# Patient Record
Sex: FEMALE | Race: WHITE | NOT HISPANIC OR LATINO | Employment: FULL TIME | URBAN - METROPOLITAN AREA
[De-identification: names, ages, dates, MRNs, and addresses within clinical notes are randomized per-mention and may not be internally consistent; named-entity substitution may affect disease eponyms.]

---

## 2019-08-20 ENCOUNTER — TELEPHONE (OUTPATIENT)
Dept: FAMILY MEDICINE CLINIC | Facility: CLINIC | Age: 19
End: 2019-08-20

## 2019-08-20 ENCOUNTER — OFFICE VISIT (OUTPATIENT)
Dept: FAMILY MEDICINE CLINIC | Facility: CLINIC | Age: 19
End: 2019-08-20
Payer: COMMERCIAL

## 2019-08-20 VITALS
TEMPERATURE: 99.8 F | WEIGHT: 144.2 LBS | HEART RATE: 84 BPM | RESPIRATION RATE: 12 BRPM | OXYGEN SATURATION: 99 % | DIASTOLIC BLOOD PRESSURE: 72 MMHG | BODY MASS INDEX: 26.54 KG/M2 | SYSTOLIC BLOOD PRESSURE: 112 MMHG | HEIGHT: 62 IN

## 2019-08-20 DIAGNOSIS — Z23 NEED FOR VACCINATION: ICD-10-CM

## 2019-08-20 DIAGNOSIS — Z00.00 WELL ADULT EXAM: Primary | ICD-10-CM

## 2019-08-20 DIAGNOSIS — IMO0001 BIRTH CONTROL: ICD-10-CM

## 2019-08-20 PROCEDURE — 90472 IMMUNIZATION ADMIN EACH ADD: CPT

## 2019-08-20 PROCEDURE — 90471 IMMUNIZATION ADMIN: CPT

## 2019-08-20 PROCEDURE — 90621 MENB-FHBP VACC 2/3 DOSE IM: CPT

## 2019-08-20 PROCEDURE — 99395 PREV VISIT EST AGE 18-39: CPT | Performed by: FAMILY MEDICINE

## 2019-08-20 PROCEDURE — 90651 9VHPV VACCINE 2/3 DOSE IM: CPT

## 2019-08-20 RX ORDER — CETIRIZINE HYDROCHLORIDE 5 MG/1
10 TABLET, CHEWABLE ORAL DAILY
COMMUNITY
End: 2021-08-26

## 2019-08-20 RX ORDER — NORGESTIMATE AND ETHINYL ESTRADIOL 7DAYSX3 LO
1 KIT ORAL DAILY
Qty: 90 TABLET | Refills: 2 | Status: SHIPPED | OUTPATIENT
Start: 2019-08-20 | End: 2020-03-09 | Stop reason: SDUPTHER

## 2019-08-20 RX ORDER — NORGESTIMATE AND ETHINYL ESTRADIOL 7DAYSX3 LO
1 KIT ORAL DAILY
Qty: 90 TABLET | Refills: 2 | Status: SHIPPED | OUTPATIENT
Start: 2019-08-20 | End: 2019-08-20 | Stop reason: SDUPTHER

## 2019-08-20 NOTE — PROGRESS NOTES
Assessment/Plan:     Problem List Items Addressed This Visit     None      Visit Diagnoses     Well adult exam    -  Primary    Need for vaccination        Relevant Orders    MENINGOCOCCAL B OMV    HPV VACCINE 9 VALENT IM    Birth control     Reviewed other options  Start pap age 23  With headache during menses she wishes to try triphasic pill (her uncle is a pharmacist and recommended this)  Consider IUD instead if this is not helping  Relevant Medications    norgestimate-ethinyl estradiol (ORTHO TRI-CYCLEN LO) 0 18/0 215/0 25 MG-25 MCG per tablet        Diagnoses and all orders for this visit:    Well adult exam       Well adult exam  ·         Continue healthy diet   ·         Encourage exercise 4 times a week or more for minimum 30 minutes  ·         Continue to see dentist, wear seatbelt  ·         Health maintenance reviewed - Bexero #2 and HPV #3 given today  She will get flu shot in the fall at school  Reviewed age appropriate health maintenance screenings and immunizations that are due, risks and benefits of these  Health Maintenance   Topic Date Due    Depression Screening PHQ  1999    BMI: Followup Plan  01/01/2017    BMI: Adult  01/01/2017    INFLUENZA VACCINE  07/01/2019    DTaP,Tdap,and Td Vaccines (7 - Td) 08/11/2020    Pneumococcal Vaccine: 65+ Years (1 of 2 - PCV13) 01/01/2064    HEPATITIS B VACCINES  Completed    Pneumococcal Vaccine: Pediatrics (0 to 5 Years) and At-Risk Patients (6 to 59 Years)  Aged Out     No follow-ups on file  There are no Patient Instructions on file for this visit  BMI Counseling: Body mass index is 26 2 kg/m²  Discussed the patient's BMI with her  The BMI is above average  BMI counseling and education was provided to the patient  Exercise recommendations include exercising 3-5 times per week        Subjective:    ULISES     Carole Chau is a 20 y o  female who presents today for a physical      Chief Complaint   Patient presents with    Physical Exam ---Above per clinical staff & reviewed  ---  No My Sticky Note on file  PHQ-9 Depression Screening    PHQ-9:    Frequency of the following problems over the past two weeks:               Diet: healthy diet   Exercise:  Little, nothing formal  More in school  Dental visits:  Yes   Seatbelt: yes     Concerns today:  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   Happy there  regular - monthly and last 5- 6 days  Heavy mid way  sprintec now - headaches during placebo pill   Moods different also  The following portions of the patient's history were reviewed and updated as appropriate: allergies, current medications, past family history, past medical history, past social history, past surgical history and problem list      Current Outpatient Medications   Medication Sig Dispense Refill    cetirizine (ZyrTEC) 5 MG chewable tablet Chew 10 mg daily      norgestimate-ethinyl estradiol (ORTHO TRI-CYCLEN LO) 0 18/0 215/0 25 MG-25 MCG per tablet Take 1 tablet by mouth daily 90 tablet 2     No current facility-administered medications for this visit  Review of Systems  ROS:  all others negative - no chest pain, SOB, normal urine and bowels  no GERD  sleeping well  mood good  Objective:      /72   Pulse 84   Temp 99 8 °F (37 7 °C)   Resp 12   Ht 5' 2 21" (1 58 m)   Wt 65 4 kg (144 lb 3 2 oz)   SpO2 99%   BMI 26 20 kg/m²     BP Readings from Last 3 Encounters:   08/20/19 112/72   07/29/15 (!) 120/60 (85 %, Z = 1 06 /  30 %, Z = -0 54)*   08/06/14 110/70 (57 %, Z = 0 19 /  70 %, Z = 0 51)*     *BP percentiles are based on the August 2017 AAP Clinical Practice Guideline for girls     Wt Readings from Last 3 Encounters:   08/20/19 65 4 kg (144 lb 3 2 oz)   07/29/15 57 6 kg (127 lb) (62 %, Z= 0 31)*   08/06/14 56 kg (123 lb 8 oz) (61 %, Z= 0 29)*     * Growth percentiles are based on [facility] (Girls, 2-20 Years) data       Physical Exam   Constitutional: she is oriented to person, place, and time  she appears well-developed and well-nourished  HENT: Head: Normocephalic  Right Ear: External ear normal  Tympanic membrane normal    Left Ear: External ear normal  Tympanic membrane normal    Nose: Nose normal  No mucosal edema, No rhinorrhea  Right sinus exhibits no maxillary sinus tenderness  Left sinus exhibits no maxillary sinus tenderness  Mouth/Throat: Oropharynx is clear and moist    Eyes: Normal conjunctiva  No erythema  No discharge  Neck: No pain on exam  Neck supple  Cardiovascular: Normal rate, regular rhythm and normal heart sounds  Pulmonary/Chest: Effort normal and breath sounds normal  No wheezes  No rales  No rhonchi  Abdominal: Soft  Bowel sounds are normal  There is no tenderness  Musculoskeletal: she exhibits no edema  Lymphadenopathy: she has no cervical adenopathy  Neurological: she  is alert and oriented to person, place, and time  Skin: Skin is warm and dry  No rashes  Psychiatric: she  has a normal mood and affect  her behavior is normal  Thought content normal    Vitals reviewed

## 2019-08-20 NOTE — TELEPHONE ENCOUNTER
Placed call to pharmacy to cancel prescription as patient wanted printed script  Medication cancelled at pharmacy

## 2020-02-08 ENCOUNTER — PATIENT MESSAGE (OUTPATIENT)
Dept: FAMILY MEDICINE CLINIC | Facility: CLINIC | Age: 20
End: 2020-02-08

## 2020-02-10 NOTE — TELEPHONE ENCOUNTER
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 Are you able to complete the documents with a date more recent than August? I can fax the documents since I am currently at school in [facility]  Thank you!      [employer]

## 2020-08-17 DIAGNOSIS — Z30.9 ENCOUNTER FOR CONTRACEPTIVE MANAGEMENT, UNSPECIFIED TYPE: ICD-10-CM

## 2020-08-17 RX ORDER — NORGESTIMATE AND ETHINYL ESTRADIOL
KIT
Qty: 84 TABLET | OUTPATIENT
Start: 2020-08-17

## 2020-08-17 NOTE — TELEPHONE ENCOUNTER
Medication refill received from [facility]  Please let the patient know that we do not accept refills directly from the pharmacy

## 2020-08-17 NOTE — TELEPHONE ENCOUNTER
Medication refill request: [facility] [address] office visit: 8/20/19  Next office visit: 8/21/20  Last refilled: 3/9/2020 #90x1  Pharmacy:   [facility] [address] [place] [id number]  Phone: [phone] Fax: [phone]    Pended #90x1

## 2020-08-18 RX ORDER — NORGESTIMATE AND ETHINYL ESTRADIOL 7DAYSX3 LO
1 KIT ORAL DAILY
Qty: 90 TABLET | Refills: 2 | Status: SHIPPED | OUTPATIENT
Start: 2020-08-18 | End: 2020-08-21 | Stop reason: SDUPTHER

## 2020-08-19 LAB — EXTERNAL CHLAMYDIA RESULT: DETECTED

## 2020-08-21 ENCOUNTER — TELEPHONE (OUTPATIENT)
Dept: ADMINISTRATIVE | Facility: OTHER | Age: 20
End: 2020-08-21

## 2020-08-21 ENCOUNTER — OFFICE VISIT (OUTPATIENT)
Dept: FAMILY MEDICINE CLINIC | Facility: CLINIC | Age: 20
End: 2020-08-21
Payer: COMMERCIAL

## 2020-08-21 VITALS
SYSTOLIC BLOOD PRESSURE: 90 MMHG | RESPIRATION RATE: 12 BRPM | OXYGEN SATURATION: 98 % | HEART RATE: 88 BPM | TEMPERATURE: 99 F | WEIGHT: 141 LBS | DIASTOLIC BLOOD PRESSURE: 60 MMHG | HEIGHT: 61 IN | BODY MASS INDEX: 26.62 KG/M2

## 2020-08-21 DIAGNOSIS — Z11.59 NEED FOR HEPATITIS C SCREENING TEST: ICD-10-CM

## 2020-08-21 DIAGNOSIS — Z30.9 ENCOUNTER FOR CONTRACEPTIVE MANAGEMENT, UNSPECIFIED TYPE: ICD-10-CM

## 2020-08-21 DIAGNOSIS — Z11.4 SCREENING FOR HIV (HUMAN IMMUNODEFICIENCY VIRUS): ICD-10-CM

## 2020-08-21 DIAGNOSIS — Z00.00 WELL ADULT EXAM: Primary | ICD-10-CM

## 2020-08-21 DIAGNOSIS — Z13.1 SCREENING FOR DIABETES MELLITUS: ICD-10-CM

## 2020-08-21 DIAGNOSIS — Z13.220 LIPID SCREENING: ICD-10-CM

## 2020-08-21 DIAGNOSIS — Z23 NEED FOR VACCINATION: ICD-10-CM

## 2020-08-21 PROCEDURE — 3008F BODY MASS INDEX DOCD: CPT | Performed by: FAMILY MEDICINE

## 2020-08-21 PROCEDURE — 90471 IMMUNIZATION ADMIN: CPT | Performed by: FAMILY MEDICINE

## 2020-08-21 PROCEDURE — 90715 TDAP VACCINE 7 YRS/> IM: CPT | Performed by: FAMILY MEDICINE

## 2020-08-21 PROCEDURE — 1036F TOBACCO NON-USER: CPT | Performed by: FAMILY MEDICINE

## 2020-08-21 PROCEDURE — 90621 MENB-FHBP VACC 2/3 DOSE IM: CPT | Performed by: FAMILY MEDICINE

## 2020-08-21 PROCEDURE — 99395 PREV VISIT EST AGE 18-39: CPT | Performed by: FAMILY MEDICINE

## 2020-08-21 PROCEDURE — 90472 IMMUNIZATION ADMIN EACH ADD: CPT | Performed by: FAMILY MEDICINE

## 2020-08-21 PROCEDURE — 3725F SCREEN DEPRESSION PERFORMED: CPT | Performed by: FAMILY MEDICINE

## 2020-08-21 RX ORDER — NORGESTIMATE AND ETHINYL ESTRADIOL 7DAYSX3 LO
1 KIT ORAL DAILY
Qty: 90 TABLET | Refills: 2 | Status: SHIPPED | OUTPATIENT
Start: 2020-08-21 | End: 2021-04-20 | Stop reason: SDUPTHER

## 2020-08-21 NOTE — TELEPHONE ENCOUNTER
Upon review of your request/inquiry, we have found/obtained the documentation  The status of this report is in progress/pending/awaiting final  Due to protocols, we are unable to hold requests for resulting/linking of a pending/ in progress/awaiting final items and are unable to proceed  Any additional questions or concerns should be emailed to the Practice Liaisons via [email or web address] email, please do not reply via In Basket       Thank you  Naomi Medley MA

## 2020-08-21 NOTE — PROGRESS NOTES
Assessment/Plan:     Problem List Items Addressed This Visit     None      Visit Diagnoses     Well adult exam    -  Primary    Need for vaccination        Relevant Orders    TDAP VACCINE GREATER THAN OR EQUAL TO 8YO IM    MENINGOCOCCAL B RECOMBINANT    Screening for HIV (human immunodeficiency virus)        Relevant Orders    HIV 1/2 Antigen/Antibody (4th Generation) w Reflex [facility]    Need for hepatitis C screening test        Relevant Orders    Hepatitis C antibody    Encounter for contraceptive management, unspecified type        Relevant Medications    norgestimate-ethinyl estradiol (Ortho Tri-Cyclen Lo) 0 18/0 215/0 25 MG-25 MCG per tablet    Lipid screening        Relevant Orders    Lipid panel    Screening for diabetes mellitus        Relevant Orders    Comprehensive metabolic panel          Well adult exam  ·         Continue healthy diet   ·         Encourage exercise 4 times a week or more for minimum 30 minutes  ·         Continue to see dentist, wear seatbelt  ·         Health maintenance reviewed and up-to-date  Reviewed age appropriate health maintenance screenings and immunizations that are due, risks and benefits of these     [place] #2 given today   Update fasting blood work   Pap & Chlamydia this with Dr Gregory Terrell this week- await results   Health Maintenance   Topic Date Due    HIV Screening  01/01/2014    Chlamydia Screening  01/01/2015    Cervical Cancer Screening  01/01/2020    Influenza Vaccine  07/01/2020    DTaP,Tdap,and Td Vaccines (7 - Td) 08/11/2020    BMI: Followup Plan  08/20/2020    Annual Physical  08/20/2020    Depression Screening PHQ  08/21/2021    BMI: Adult  08/21/2021    HIB Vaccine  Completed    Hepatitis B Vaccine  Completed    IPV Vaccine  Completed    Hepatitis A Vaccine  Completed    Meningococcal ACWY Vaccine  Completed    HPV Vaccine  Completed    Pneumococcal Vaccine: Pediatrics (0 to 5 Years) and At-Risk Patients (6 to 59 Years)  Aged Out     No follow-ups on file  Subjective:    HPI     Merlin Andrea is a 23 y o  female who presents today for a physical      Chief Complaint   Patient presents with    Annual Exam     no concerns  PHQ-9 Depression Screening    PHQ-9:    Frequency of the following problems over the past two weeks:       Little interest or pleasure in doing things:  0 - not at all  Feeling down, depressed, or hopeless:  0 - not at all  PHQ-2 Score:  0        ---Above per clinical staff & reviewed  ---  Patient here today for a physical:  No My Sticky Note on file  Diet: healthy   Exercise:  3 -4 times a week - treadmill, outside   Dental visits:  Yes, overdue due to COVID   Seatbelt: yes     Wed had first pap - not bad     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           The following portions of the patient's history were reviewed and updated as appropriate: allergies, current medications, past family history, past medical history, past social history, past surgical history and problem list      Current Outpatient Medications   Medication Sig Dispense Refill    cetirizine (ZyrTEC) 5 MG chewable tablet Chew 10 mg daily      norgestimate-ethinyl estradiol (Ortho Tri-Cyclen Lo) 0 18/0 215/0 25 MG-25 MCG per tablet Take 1 tablet by mouth daily 90 tablet 2     No current facility-administered medications for this visit  Objective:      BP 90/60   Pulse 88   Temp 99 °F (37 2 °C)   Resp 12   Ht 5' 1" (1 549 m)   Wt 64 kg (141 lb)   SpO2 98%   BMI 26 64 kg/m²   BP Readings from Last 3 Encounters:   08/21/20 90/60   08/20/19 112/72   07/29/15 (!) 120/60 (85 %, Z = 1 06 /  30 %, Z = -0 54)*     *BP percentiles are based on the 2017 AAP Clinical Practice Guideline for girls     Wt Readings from Last 3 Encounters:   08/21/20 64 kg (141 lb)   08/20/19 65 4 kg (144 lb 3 2 oz)   07/29/15 57 6 kg (127 lb) (62 %, Z= 0 31)*     * Growth percentiles are based on CDC (Girls, 2-20 Years) data         Review of Systems  ROS:  all others negative - no chest pain, SOB, normal urine and bowels  no GERD  sleeping well  mood good  Physical Exam   Constitutional: she appears well-developed and well-nourished  HENT: Head: Normocephalic  Right Ear: External ear normal  Tympanic membrane normal    Left Ear: External ear normal  Tympanic membrane normal    Nose: Nose normal  No mucosal edema, No rhinorrhea  Right sinus exhibits no maxillary sinus tenderness  Left sinus exhibits no maxillary sinus tenderness  Mouth/Throat: Oropharynx is clear and moist    Eyes: Normal conjunctiva  No erythema  No discharge  Neck: No pain on exam  Neck supple  Cardiovascular: Normal rate, regular rhythm and normal heart sounds  Pulmonary/Chest: Effort normal and breath sounds normal  No wheezes  No rales  No rhonchi  Abdominal: Soft  Bowel sounds are normal  There is no tenderness  Musculoskeletal: she exhibits no edema  Lymphadenopathy: she has no cervical adenopathy  Neurological: she  is alert and oriented to person, place, and time  Skin: Skin is warm and dry  No rashes  Psychiatric: she  has a normal mood and affect  her behavior is normal  Thought content normal    Vitals reviewed  BMI Counseling: Body mass index is 26 64 kg/m²  The BMI is above normal  Nutrition recommendations include decreasing portion sizes  Exercise recommendations include exercising 3-5 times per week

## 2020-08-21 NOTE — TELEPHONE ENCOUNTER
----- Message from Li Sanz sent at 2020  4:44 PM EDT -----  RegardinBernie Donahue  20 4:44 PM    Hello, our patient Lawernce Mess has had Chlamydia and Pap Smear (HPV) aka Cervical Cancer Screening completed/performed  Please assist in updating the patient chart by pulling the document from Encounter Tab within Chart Review   The date of service is 20    Thank you,  Malena Meyers MA  [facility]

## 2021-01-27 ENCOUNTER — PATIENT MESSAGE (OUTPATIENT)
Dept: FAMILY MEDICINE CLINIC | Facility: CLINIC | Age: 21
End: 2021-01-27

## 2021-01-28 NOTE — TELEPHONE ENCOUNTER
From: Joselito Valenzuela LPN  To: Kerry Bowman  Sent: 1/27/2021 11:56 AM EST  Subject: Influenza Vaccine     Hello,     We hope you are doing well  While it is important to receive your flu vaccine yearly, our records show that you have not yet received yours for this year  Please call the office at [phone] if you are interested in scheduling a visit to receive the vaccine  If you have had your flu vaccine outside of our office, please reply to this message with the date and location so we can update your medical record  If you have already received your flu vaccine in our office, please disregard this message            We look forward to hearing from you,    Your Medical Team at [address]

## 2021-04-20 DIAGNOSIS — Z30.9 ENCOUNTER FOR CONTRACEPTIVE MANAGEMENT, UNSPECIFIED TYPE: ICD-10-CM

## 2021-04-21 RX ORDER — NORGESTIMATE AND ETHINYL ESTRADIOL 7DAYSX3 LO
1 KIT ORAL DAILY
Qty: 90 TABLET | Refills: 2 | Status: SHIPPED | OUTPATIENT
Start: 2021-04-21 | End: 2022-01-12 | Stop reason: SDUPTHER

## 2021-04-21 NOTE — TELEPHONE ENCOUNTER
Medication refill requested: Parker Amezcua-Cyclekiko Somers  Last office visit: 08/21/20  Next office visit: None  Last refilled: 08/21/20, 90 x 2  Pharmacy :   [facility] [address] [place] [id number]  Phone: [phone] Fax: [phone]       Pended: 90 x 2

## 2021-04-26 DIAGNOSIS — Z30.9 ENCOUNTER FOR CONTRACEPTIVE MANAGEMENT, UNSPECIFIED TYPE: ICD-10-CM

## 2021-04-26 RX ORDER — NORGESTIMATE AND ETHINYL ESTRADIOL
KIT
Qty: 84 TABLET | OUTPATIENT
Start: 2021-04-26

## 2021-04-26 NOTE — TELEPHONE ENCOUNTER
Medication refill received from [facility]  Please let the patient know that we do not accept refills directly from the pharmacy

## 2021-08-23 ENCOUNTER — TELEPHONE (OUTPATIENT)
Dept: ADMINISTRATIVE | Facility: OTHER | Age: 21
End: 2021-08-23

## 2021-08-23 NOTE — TELEPHONE ENCOUNTER
----- Message from Li Buchanan sent at 8/20/2021  4:07 PM EDT -----  Regarding: FELTON BRODY  08/20/21 4:08 PM    Hello, our patient Kirill Newman has had Chlamydia completed/performed  Please assist in updating the patient chart by pulling a previous Electronic Medical Record (EMR) document  The previous EMR is CE   The date of service is 8/19/20    Thank you,  Manuelito Cintron, MA  PG  Ana M Hoskins

## 2021-08-23 NOTE — TELEPHONE ENCOUNTER
----- Message from Jared Albright, [address] [place] sent at 8/20/2021  4:06 PM EDT -----  Regarding: FELTON BRODY  08/20/21 4:07 PM    Hello, our patient Joyce Guerra has had Pap Smear (HPV) aka Cervical Cancer Screening completed/performed  Please assist in updating the patient chart by pulling a previous Electronic Medical Record (EMR) document  The previous EMR is CE   The date of service is 8/19/20    Thank you,  Jared Albright MA  PG  David Manual

## 2021-08-24 NOTE — TELEPHONE ENCOUNTER
Upon review of the In Basket request we were able to locate, review, and update the patient chart as requested for Chlamydia and Pap Smear (HPV) aka Cervical Cancer Screening  Any additional questions or concerns should be emailed to the Practice Liaisons via [email or web address] email, please do not reply via In Basket      Thank you  Dougie Civil

## 2021-08-26 ENCOUNTER — OFFICE VISIT (OUTPATIENT)
Dept: FAMILY MEDICINE CLINIC | Facility: CLINIC | Age: 21
End: 2021-08-26
Payer: COMMERCIAL

## 2021-08-26 VITALS
BODY MASS INDEX: 26.39 KG/M2 | RESPIRATION RATE: 18 BRPM | SYSTOLIC BLOOD PRESSURE: 118 MMHG | DIASTOLIC BLOOD PRESSURE: 72 MMHG | WEIGHT: 139.8 LBS | HEIGHT: 61 IN | OXYGEN SATURATION: 98 % | HEART RATE: 80 BPM | TEMPERATURE: 97.5 F

## 2021-08-26 DIAGNOSIS — Z11.8 SCREENING FOR CHLAMYDIAL DISEASE: ICD-10-CM

## 2021-08-26 DIAGNOSIS — Z00.00 WELL ADULT EXAM: Primary | ICD-10-CM

## 2021-08-26 DIAGNOSIS — J30.1 SEASONAL ALLERGIC RHINITIS DUE TO POLLEN: ICD-10-CM

## 2021-08-26 PROCEDURE — 3725F SCREEN DEPRESSION PERFORMED: CPT | Performed by: FAMILY MEDICINE

## 2021-08-26 PROCEDURE — 3008F BODY MASS INDEX DOCD: CPT | Performed by: FAMILY MEDICINE

## 2021-08-26 PROCEDURE — 1036F TOBACCO NON-USER: CPT | Performed by: FAMILY MEDICINE

## 2021-08-26 PROCEDURE — 99395 PREV VISIT EST AGE 18-39: CPT | Performed by: FAMILY MEDICINE

## 2021-08-26 RX ORDER — CETIRIZINE HYDROCHLORIDE 10 MG/1
10 TABLET ORAL DAILY
Start: 2021-08-26

## 2021-08-26 NOTE — PROGRESS NOTES
Assessment/Plan:     Problem List Items Addressed This Visit     None      Visit Diagnoses     Well adult exam    -  Primary    Screening for chlamydial disease        Relevant Orders    Chlamydia/GC amplified DNA by PCR    Seasonal allergic rhinitis due to pollen        Relevant Medications    cetirizine (ZyrTEC) 10 mg tablet      labs done 2 days ago  she will call us if we do not contact her with lab results by next week     Well adult exam  ·         Continue healthy diet   ·         Encourage exercise 4 times a week or more for minimum 30 minutes  ·         Continue to see dentist, wear seatbelt  ·         Health maintenance reviewed and up-to-date  Reviewed age appropriate health maintenance screenings and immunizations that are due, risks and benefits of these     Health Maintenance   Topic Date Due    Hepatitis C Screening  Never done    HIV Screening  Never done    Chlamydia Screening  08/19/2021    Influenza Vaccine (1) 09/01/2021    Depression Screening PHQ  08/26/2022    BMI: Followup Plan  08/26/2022    BMI: Adult  08/26/2022    Annual Physical  08/26/2022    Cervical Cancer Screening  08/19/2023    DTaP,Tdap,and Td Vaccines (8 - Td or Tdap) 08/21/2030    HIB Vaccine  Completed    Hepatitis B Vaccine  Completed    IPV Vaccine  Completed    Hepatitis A Vaccine  Completed    Meningococcal ACWY Vaccine  Completed    HPV Vaccine  Completed    COVID-19 Vaccine  Completed    Pneumococcal Vaccine: Pediatrics (0 to 5 Years) and At-Risk Patients (6 to 59 Years)  Aged Out     Return in about 1 year (around 8/26/2022) for Annual physical     Subjective:    ULISES Quinones is a 24 y o  female who presents today for a physical      Chief Complaint   Patient presents with    Physical Exam     PHQ-9 Depression Screening    PHQ-9:   Frequency of the following problems over the past two weeks:      Little interest or pleasure in doing things: 0 - not at all  Feeling down, depressed, or hopeless: 0 - not at all  PHQ-2 Score: 0        ---Above per clinical staff & reviewed  ---  Patient here today for a physical:    Diet: healthy diet   Exercise: At home, you tube cardio   Dental visits:  Yes   Seatbelt: yes     Concerns today:  No concerns  Had blood work tues at [address] well on OCP   Not currently in a relationship - has been using protection every time when she is   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  The following portions of the patient's history were reviewed and updated as appropriate: allergies, current medications, past family history, past medical history, past social history, past surgical history and problem list      Current Medications:  Current Outpatient Medications   Medication Sig Dispense Refill    norgestimate-ethinyl estradiol (Ortho Tri-Cyclen Lo) 0 18/0 215/0 25 MG-25 MCG per tablet Take 1 tablet by mouth daily 90 tablet 2    cetirizine (ZyrTEC) 10 mg tablet Take 1 tablet (10 mg total) by mouth daily       No current facility-administered medications for this visit  Objective:      /72   Pulse 80   Temp 97 5 °F (36 4 °C)   Resp 18   Ht 5' 0 55" (1 538 m)   Wt 63 4 kg (139 lb 12 8 oz)   SpO2 98%   BMI 26 81 kg/m²   BP Readings from Last 3 Encounters:   08/26/21 118/72   08/21/20 90/60   08/20/19 112/72     Wt Readings from Last 3 Encounters:   08/26/21 63 4 kg (139 lb 12 8 oz)   08/21/20 64 kg (141 lb)   08/20/19 65 4 kg (144 lb 3 2 oz)       Review of Systems  ROS:  all others negative - no chest pain, SOB, normal urine and bowels  no GERD  sleeping well  mood good  Physical Exam   Constitutional: she appears well-developed and well-nourished  HENT: Head: Normocephalic  Right Ear: External ear normal  Tympanic membrane normal    Left Ear: External ear normal  Tympanic membrane normal    Nose: Nose normal  No mucosal edema, No rhinorrhea  Right sinus exhibits no maxillary sinus tenderness     Left sinus exhibits no maxillary sinus tenderness  Mouth/Throat: Oropharynx is clear and moist    Eyes: Normal conjunctiva  No erythema  No discharge  Neck: No pain on exam  Neck supple  Cardiovascular: Normal rate, regular rhythm and normal heart sounds  Pulmonary/Chest: Effort normal and breath sounds normal  No wheezes  No rales  No rhonchi  Abdominal: Soft  Bowel sounds are normal  There is no tenderness  Musculoskeletal: she exhibits no edema  Lymphadenopathy: she has no cervical adenopathy  Neurological: she  is alert and oriented to person, place, and time  Skin: Skin is warm and dry  No rashes  Psychiatric: she  has a normal mood and affect  her behavior is normal  Thought content normal    Vitals reviewed  BMI Counseling: Body mass index is 26 81 kg/m²  The BMI is above normal  Nutrition recommendations include decreasing portion sizes  Exercise recommendations include exercising 3-5 times per week

## 2022-01-12 DIAGNOSIS — Z30.9 ENCOUNTER FOR CONTRACEPTIVE MANAGEMENT, UNSPECIFIED TYPE: ICD-10-CM

## 2022-01-12 RX ORDER — NORGESTIMATE AND ETHINYL ESTRADIOL 7DAYSX3 LO
1 KIT ORAL DAILY
Qty: 90 TABLET | Refills: 0 | Status: SHIPPED | OUTPATIENT
Start: 2022-01-12 | End: 2022-04-01 | Stop reason: SDUPTHER

## 2022-04-01 DIAGNOSIS — Z30.9 ENCOUNTER FOR CONTRACEPTIVE MANAGEMENT, UNSPECIFIED TYPE: ICD-10-CM

## 2022-04-01 RX ORDER — NORGESTIMATE AND ETHINYL ESTRADIOL 7DAYSX3 LO
1 KIT ORAL DAILY
Qty: 90 TABLET | Refills: 0 | Status: SHIPPED | OUTPATIENT
Start: 2022-04-01 | End: 2022-06-28 | Stop reason: SDUPTHER

## 2022-05-04 LAB
EXTERNAL CHLAMYDIA RESULT: NEGATIVE
N GONORRHOEA RRNA SPEC QL PROBE: NEGATIVE

## 2022-06-28 DIAGNOSIS — Z30.9 ENCOUNTER FOR CONTRACEPTIVE MANAGEMENT, UNSPECIFIED TYPE: ICD-10-CM

## 2022-06-28 RX ORDER — NORGESTIMATE AND ETHINYL ESTRADIOL
KIT
Qty: 84 TABLET | OUTPATIENT
Start: 2022-06-28

## 2022-11-16 DIAGNOSIS — Z30.9 ENCOUNTER FOR CONTRACEPTIVE MANAGEMENT, UNSPECIFIED TYPE: ICD-10-CM

## 2022-11-16 RX ORDER — NORGESTIMATE AND ETHINYL ESTRADIOL 7DAYSX3 LO
1 KIT ORAL DAILY
Qty: 90 TABLET | Refills: 2 | Status: SHIPPED | OUTPATIENT
Start: 2022-11-16

## 2022-11-16 NOTE — TELEPHONE ENCOUNTER
Medication refill requested: norgestimate-ethinyl estradiol (Ortho Tri-Cyclen Lo) 0 18/0 215/0 25 MG-25 MCG per tablet  Last office visit: 08/26/2021  Next office visit: 01/06/2023  Last refilled: 06/28/2022  Labs: No  Ordering Provider: [id number] [place] [address] (select pharmacy send RX to):     [place] [address]  [facility]  [facility] [address]  Phone: [phone] Fax: [phone]

## 2023-01-06 ENCOUNTER — TELEPHONE (OUTPATIENT)
Dept: ADMINISTRATIVE | Facility: OTHER | Age: 23
End: 2023-01-06

## 2023-01-06 ENCOUNTER — OFFICE VISIT (OUTPATIENT)
Dept: FAMILY MEDICINE CLINIC | Facility: CLINIC | Age: 23
End: 2023-01-06

## 2023-01-06 VITALS
SYSTOLIC BLOOD PRESSURE: 118 MMHG | DIASTOLIC BLOOD PRESSURE: 76 MMHG | HEIGHT: 63 IN | TEMPERATURE: 98 F | WEIGHT: 137 LBS | BODY MASS INDEX: 24.27 KG/M2 | OXYGEN SATURATION: 98 % | HEART RATE: 87 BPM

## 2023-01-06 DIAGNOSIS — F41.9 ANXIETY: ICD-10-CM

## 2023-01-06 DIAGNOSIS — Z00.00 WELL ADULT EXAM: Primary | ICD-10-CM

## 2023-01-06 RX ORDER — ESCITALOPRAM OXALATE 10 MG/1
10 TABLET ORAL DAILY
Qty: 90 TABLET | Refills: 0 | Status: SHIPPED | OUTPATIENT
Start: 2023-01-06

## 2023-01-06 RX ORDER — ESCITALOPRAM OXALATE 10 MG/1
TABLET ORAL
Qty: 90 TABLET | OUTPATIENT
Start: 2023-01-06

## 2023-01-06 RX ORDER — ESCITALOPRAM OXALATE 10 MG/1
10 TABLET ORAL DAILY
Qty: 30 TABLET | Refills: 5 | Status: SHIPPED | OUTPATIENT
Start: 2023-01-06 | End: 2023-01-06 | Stop reason: SDUPTHER

## 2023-01-06 NOTE — TELEPHONE ENCOUNTER
Upon review of the In Basket request we were able to locate, review, and update the patient chart as requested for Chlamydia and Pap Smear (HPV) aka Cervical Cancer Screening  Any additional questions or concerns should be emailed to the Practice Liaisons via the appropriate education email address, please do not reply via In Basket      Thank you  Jewels Bustos MA

## 2023-01-06 NOTE — PROGRESS NOTES
Assessment/Plan:     Nhi Haider was seen today for annual exam     Diagnoses and all orders for this visit:    Well adult exam    Anxiety  New diagnosis  Has been working on techniques to help with relaxation  Not finding these adequate  Finding anxiety to be interfering with her life  Would like to try medication  Reviewed risks benefits and side effects of medication  We will start Lexapro 10 mg  Follow-up in about 4 weeks  Virtual or in person visit carol  Prescription:   escitalopram (Lexapro) 10 mg tablet; Take 1 tablet (10 mg total) by mouth daily         Well adult exam  ·         Continue healthy diet   ·         Encourage exercise 4 times a week or more for minimum 30 minutes  ·         Continue to see dentist, wear seatbelt  ·         Health maintenance reviewed and up-to-date  Had chlamydia testing through [facility] -  will request records  Reviewed age appropriate health maintenance screenings and immunizations that are due, risks and benefits of these  Health Maintenance   Topic Date Due   • COVID-19 Vaccine (4 - Booster for Moderna series) 01/27/2022   • Chlamydia Screening  05/04/2023   • Depression Screening  01/06/2024   • BMI: Adult  01/06/2024   • Annual Physical  01/06/2024   • Cervical Cancer Screening  05/04/2025   • DTaP,Tdap,and Td Vaccines (8 - Td or Tdap) 08/21/2030   • HIV Screening  Completed   • Hepatitis C Screening  Completed   • HIB Vaccine  Completed   • Hepatitis B Vaccine  Completed   • IPV Vaccine  Completed   • Hepatitis A Vaccine  Completed   • Meningococcal ACWY Vaccine  Completed   • Influenza Vaccine  Completed   • HPV Vaccine  Completed   • Pneumococcal Vaccine: Pediatrics (0 to 5 Years) and At-Risk Patients (6 to 59 Years)  Aged Out     Return in about 1 month (around 2/6/2023) for mood check in person       Subjective:    HPI     Nhi Haider is a 24 y o  female who presents today for a physical      Chief Complaint   Patient presents with   • Annual Exam     Pt reports no concerns, copy made of COVID vaccine card and dates updated         Patient Care Team:  Alice Fernández DO as PCP - General (Family Medicine)  Gigi Prather (Obstetrics and Gynecology)    PHQ-2/9 Depression Screening    Little interest or pleasure in doing things: 0 - not at all  Feeling down, depressed, or hopeless: 0 - not at all  Trouble falling or staying asleep, or sleeping too much: 2 - more than half the days  Feeling tired or having little energy: 1 - several days  Poor appetite or overeatin - not at all  Feeling bad about yourself - or that you are a failure or have let yourself or your family down: 1 - several days  Trouble concentrating on things, such as reading the newspaper or watching television: 0 - not at all  Moving or speaking so slowly that other people could have noticed  Or the opposite - being so fidgety or restless that you have been moving around a lot more than usual: 0 - not at all  Thoughts that you would be better off dead, or of hurting yourself in some way: 0 - not at all  PHQ-2 Score: 0  PHQ-2 Interpretation: Negative depression screen  PHQ-9 Score: 4   PHQ-9 Interpretation: No or Minimal depression         WARREN-7 Flowsheet Screening    Flowsheet Row Most Recent Value   Over the last 2 weeks, how often have you been bothered by any of the following problems? Feeling nervous, anxious, or on edge 2   Not being able to stop or control worrying 2   Worrying too much about different things 2   Trouble relaxing 1   Being so restless that it is hard to sit still 0   Becoming easily annoyed or irritable 2   Feeling afraid as if something awful might happen 0   WARREN-7 Total Score 9          ---Above per clinical staff & reviewed   ---  Patient here today for a physical:    Diet: healthy   Exercise:  E[address] visits:  Yes   Seatbelt: yes   sleeping 8 hours     Concerns today:  Applying for residency at [place] and at [address] [place] [address] on birth control and with periods  Modo okay More anxiety lately   Driving   public speaking and even rinring left at a light   Trying self regulating  Worse in last 6 months   Tries to relax, tries to plan out   Will try to plan another route   More on edge, takes out on her family and her boyfriend   Worrying and trouble controlling it   Gets in her head     COVID had 3 shots           The following portions of the patient's history were reviewed and updated as appropriate: allergies, current medications, past family history, past medical history, past social history, past surgical history and problem list      Current Medications:  Current Outpatient Medications   Medication Sig Dispense Refill   • cetirizine (ZyrTEC) 10 mg tablet Take 1 tablet (10 mg total) by mouth daily     • escitalopram (Lexapro) 10 mg tablet Take 1 tablet (10 mg total) by mouth daily 90 tablet 0   • norgestimate-ethinyl estradiol (Ortho Tri-Cyclen Lo) 0 18/0 215/0 25 MG-25 MCG per tablet Take 1 tablet by mouth daily 90 tablet 2     No current facility-administered medications for this visit  Objective:      /76 (BP Location: Left arm, Patient Position: Sitting, Cuff Size: Adult)   Pulse 87   Temp 98 °F (36 7 °C)   Ht 5' 3" (1 6 m)   Wt 62 1 kg (137 lb)   SpO2 98%   BMI 24 27 kg/m²   BP Readings from Last 3 Encounters:   01/06/23 118/76   08/26/21 118/72   08/21/20 90/60     Wt Readings from Last 3 Encounters:   01/06/23 62 1 kg (137 lb)   08/26/21 63 4 kg (139 lb 12 8 oz)   08/21/20 64 kg (141 lb)       Review of Systems  ROS:  all others negative - no chest pain, SOB, normal urine and bowels  no GERD  sleeping well  mood good  Physical Exam   Constitutional: she appears well-developed and well-nourished  HENT: Head: Normocephalic  Right Ear: External ear normal  Tympanic membrane normal    Left Ear: External ear normal  Tympanic membrane normal    Nose: Nose normal  No mucosal edema, No rhinorrhea  Right sinus exhibits no maxillary sinus tenderness  Left sinus exhibits no maxillary sinus tenderness  Mouth/Throat: Oropharynx is clear and moist    Eyes: Normal conjunctiva  No erythema  No discharge  Neck: No pain on exam  Neck supple  Cardiovascular: Normal rate, regular rhythm and normal heart sounds  Pulmonary/Chest: Effort normal and breath sounds normal  No wheezes  No rales  No rhonchi  Abdominal: Soft  Bowel sounds are normal  There is no tenderness  Musculoskeletal: she exhibits no edema  Lymphadenopathy: she has no cervical adenopathy  Neurological: she  is alert and oriented to person, place, and time  Skin: Skin is warm and dry  No rashes  Psychiatric: she  has a normal mood and affect  her behavior is normal  Thought content normal    Vitals reviewed  Depression Screening and Follow-up Plan: Patient was screened for depression during today's encounter  They screened negative with a PHQ-2 score of 0

## 2023-01-06 NOTE — TELEPHONE ENCOUNTER
----- Message from Richard Dodd, [address] sent at 1/6/2023  8:27 AM EST -----  Regarding: care gap request  01/06/23 8:27 AM    Hello, our patient attached above has had Chlamydia completed/performed  Please assist in updating the patient chart by pulling the Care Everywhere (CE) document  The date of service is 2022       Thank you,  Richard Dodd  [facility]  Aishwarya Nieves

## 2023-02-06 ENCOUNTER — OFFICE VISIT (OUTPATIENT)
Dept: FAMILY MEDICINE CLINIC | Facility: CLINIC | Age: 23
End: 2023-02-06

## 2023-02-06 VITALS
SYSTOLIC BLOOD PRESSURE: 124 MMHG | WEIGHT: 137 LBS | TEMPERATURE: 98.3 F | HEART RATE: 81 BPM | DIASTOLIC BLOOD PRESSURE: 82 MMHG | HEIGHT: 62 IN | BODY MASS INDEX: 25.21 KG/M2 | OXYGEN SATURATION: 98 %

## 2023-02-06 DIAGNOSIS — F41.9 ANXIETY: Primary | ICD-10-CM

## 2023-02-06 RX ORDER — ESCITALOPRAM OXALATE 10 MG/1
10 TABLET ORAL DAILY
Qty: 90 TABLET | Refills: 2 | Status: SHIPPED | OUTPATIENT
Start: 2023-02-06

## 2023-02-06 NOTE — PROGRESS NOTES
Giancarlo Santoyo was seen today for follow-up  Diagnoses and all orders for this visit:    Anxiety  Happy with Lexapro 10 mg   Will stay on this, Refill today   Follow up for phyical  -     escitalopram (Lexapro) 10 mg tablet; Take 1 tablet (10 mg total) by mouth daily         Return in about 11 months (around 2024) for Annual physical     Subjective:   Giancarlo Santoyo is a 24 y o  female here today for a follow-up on her current medical conditions: There is no problem list on file for this patient  Patient Care Team:  Yen Aldana DO as PCP - General (Family Medicine)  Estiven Lewis (Obstetrics and Gynecology)    Current Medications:  Current Outpatient Medications   Medication Sig Dispense Refill   • cetirizine (ZyrTEC) 10 mg tablet Take 1 tablet (10 mg total) by mouth daily     • escitalopram (Lexapro) 10 mg tablet Take 1 tablet (10 mg total) by mouth daily 90 tablet 2   • norgestimate-ethinyl estradiol (Ortho Tri-Cyclen Lo) 0 18/0 215/0 25 MG-25 MCG per tablet Take 1 tablet by mouth daily 90 tablet 2     No current facility-administered medications for this visit  HPI:  Chief Complaint   Patient presents with   • Follow-up     4 week mood     -- Above per clinical staff and reviewed  --    PHQ-2/9 Depression Screening    Little interest or pleasure in doing things: 0 - not at all  Feeling down, depressed, or hopeless: 0 - not at all  Trouble falling or staying asleep, or sleeping too much: 0 - not at all  Feeling tired or having little energy: 0 - not at all  Poor appetite or overeatin - not at all  Feeling bad about yourself - or that you are a failure or have let yourself or your family down: 0 - not at all  Trouble concentrating on things, such as reading the newspaper or watching television: 0 - not at all  Moving or speaking so slowly that other people could have noticed   Or the opposite - being so fidgety or restless that you have been moving around a lot more than usual: 0 - not at all  Thoughts that you would be better off dead, or of hurting yourself in some way: 0 - not at all  PHQ-2 Score: 0  PHQ-2 Interpretation: Negative depression screen  PHQ-9 Score: 0   PHQ-9 Interpretation: No or Minimal depression        WARREN-7 Flowsheet Screening    Flowsheet Row Most Recent Value   Over the last 2 weeks, how often have you been bothered by any of the following problems? Feeling nervous, anxious, or on edge 1   Not being able to stop or control worrying 0   Worrying too much about different things 1   Trouble relaxing 0   Being so restless that it is hard to sit still 0   Becoming easily annoyed or irritable 1   Feeling afraid as if something awful might happen 0   WARREN-7 Total Score 3           No My Sticky Note on file  Today:  Wants to go to [employer] [facility] [employer]   - one year   Doing a lot better with her mental health  Not on edge as often   Not as anxious   Not worrying as often  No side effects  Has had interviews since starting medication       The following portions of the patient's history were reviewed and updated as appropriate: allergies, current medications, past family history, past medical history, past social history, past surgical history and problem list     Objective:  Vitals:  /82 (BP Location: Left arm, Patient Position: Sitting, Cuff Size: Standard)   Pulse 81   Temp 98 3 °F (36 8 °C) (Tympanic)   Ht 5' 2" (1 575 m)   Wt 62 1 kg (137 lb)   SpO2 98%   BMI 25 06 kg/m²    Wt Readings from Last 3 Encounters:   02/06/23 62 1 kg (137 lb)   01/06/23 62 1 kg (137 lb)   08/26/21 63 4 kg (139 lb 12 8 oz)      BP Readings from Last 3 Encounters:   02/06/23 124/82   01/06/23 118/76   08/26/21 118/72        Review of Systems   She has no other concerns  No unexpected weight changes  No chest pain, SOB, or palpitations  No GERD  No changes in bowels or bladder  Sleeping well  No mood changes       Physical Exam   Constitutional:  she appears well-developed and well-nourished  HENT: Head: Normocephalic  Neck: Neck supple  Cardiovascular: Normal rate, regular rhythm and normal heart sounds  Pulmonary/Chest: Effort normal and breath sounds normal  No wheezes, rales, or rhonchi  Abdominal: Soft  Bowel sounds are normal  There is no tenderness  No hepatosplenomegaly  Musculoskeletal: she exhibits no edema  Lymphadenopathy: she has no cervical adenopathy  Neurological: she is alert and oriented to person, place, and time  Skin: Skin is warm and dry  Psychiatric: she has a normal mood and affect  her behavior is normal  Thought content normal      BMI Counseling: Body mass index is 25 06 kg/m²  The BMI is above normal  Nutrition recommendations include decreasing portion sizes  Exercise recommendations include exercising 3-5 times per week  Rationale for BMI follow-up plan is due to patient being overweight or obese  Depression Screening and Follow-up Plan: Patient was screened for depression during today's encounter  They screened negative with a PHQ-2 score of 0

## 2023-07-25 DIAGNOSIS — Z30.9 ENCOUNTER FOR CONTRACEPTIVE MANAGEMENT, UNSPECIFIED TYPE: ICD-10-CM

## 2023-07-25 RX ORDER — NORGESTIMATE AND ETHINYL ESTRADIOL 7DAYSX3 LO
1 KIT ORAL DAILY
Qty: 84 TABLET | Refills: 3 | Status: SHIPPED | OUTPATIENT
Start: 2023-07-25